# Patient Record
Sex: FEMALE | Race: BLACK OR AFRICAN AMERICAN | URBAN - METROPOLITAN AREA
[De-identification: names, ages, dates, MRNs, and addresses within clinical notes are randomized per-mention and may not be internally consistent; named-entity substitution may affect disease eponyms.]

---
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
## 2025-07-12 ENCOUNTER — APPOINTMENT (OUTPATIENT)
Dept: CT IMAGING | Age: 20
End: 2025-07-12
Attending: STUDENT IN AN ORGANIZED HEALTH CARE EDUCATION/TRAINING PROGRAM

## 2025-07-12 ENCOUNTER — HOSPITAL ENCOUNTER (EMERGENCY)
Age: 20
Discharge: HOME | End: 2025-07-12
Attending: STUDENT IN AN ORGANIZED HEALTH CARE EDUCATION/TRAINING PROGRAM

## 2025-07-12 VITALS
RESPIRATION RATE: 18 BRPM | HEART RATE: 64 BPM | DIASTOLIC BLOOD PRESSURE: 53 MMHG | SYSTOLIC BLOOD PRESSURE: 114 MMHG | WEIGHT: 237 LBS | OXYGEN SATURATION: 100 % | BODY MASS INDEX: 39 KG/M2 | TEMPERATURE: 98 F

## 2025-07-12 DIAGNOSIS — K52.9 GASTROENTERITIS: Primary | ICD-10-CM

## 2025-07-12 LAB
ALBUMIN SERPL-MCNC: 4.3 G/DL (ref 3.2–4.8)
ALBUMIN/GLOB SERPL: 1.3 {RATIO} (ref 1–2)
ALP LIVER SERPL-CCNC: 99 U/L (ref 52–144)
ALT SERPL-CCNC: 9 U/L (ref 10–49)
ANION GAP SERPL CALC-SCNC: 7 MMOL/L (ref 0–18)
AST SERPL-CCNC: 16 U/L (ref ?–34)
B-HCG UR QL: NEGATIVE
BASOPHILS # BLD AUTO: 0.04 X10(3) UL (ref 0–0.2)
BASOPHILS NFR BLD AUTO: 0.3 %
BILIRUB SERPL-MCNC: 0.4 MG/DL (ref 0.3–1.2)
BUN BLD-MCNC: 9 MG/DL (ref 9–23)
CALCIUM BLD-MCNC: 9 MG/DL (ref 8.7–10.6)
CHLORIDE SERPL-SCNC: 104 MMOL/L (ref 98–112)
CO2 SERPL-SCNC: 24 MMOL/L (ref 21–32)
CREAT BLD-MCNC: 0.71 MG/DL (ref 0.55–1.02)
EGFRCR SERPLBLD CKD-EPI 2021: 125 ML/MIN/1.73M2 (ref 60–?)
EOSINOPHIL # BLD AUTO: 0.06 X10(3) UL (ref 0–0.7)
EOSINOPHIL NFR BLD AUTO: 0.5 %
ERYTHROCYTE [DISTWIDTH] IN BLOOD BY AUTOMATED COUNT: 19 %
GLOBULIN PLAS-MCNC: 3.4 G/DL (ref 2–3.5)
GLUCOSE BLD-MCNC: 90 MG/DL (ref 70–99)
HCT VFR BLD AUTO: 28.7 % (ref 35–48)
HGB BLD-MCNC: 9.4 G/DL (ref 12–16)
IMM GRANULOCYTES # BLD AUTO: 0.05 X10(3) UL (ref 0–1)
IMM GRANULOCYTES NFR BLD: 0.4 %
LIPASE SERPL-CCNC: 24 U/L (ref 12–53)
LYMPHOCYTES # BLD AUTO: 1.54 X10(3) UL (ref 1–4)
LYMPHOCYTES NFR BLD AUTO: 11.6 %
MCH RBC QN AUTO: 19.6 PG (ref 26–34)
MCHC RBC AUTO-ENTMCNC: 32.8 G/DL (ref 31–37)
MCV RBC AUTO: 59.9 FL (ref 80–100)
MONOCYTES # BLD AUTO: 0.57 X10(3) UL (ref 0.1–1)
MONOCYTES NFR BLD AUTO: 4.3 %
NEUTROPHILS # BLD AUTO: 11 X10 (3) UL (ref 1.5–7.7)
NEUTROPHILS # BLD AUTO: 11 X10(3) UL (ref 1.5–7.7)
NEUTROPHILS NFR BLD AUTO: 82.9 %
OSMOLALITY SERPL CALC.SUM OF ELEC: 278 MOSM/KG (ref 275–295)
PLATELET # BLD AUTO: 504 10(3)UL (ref 150–450)
POTASSIUM SERPL-SCNC: 4 MMOL/L (ref 3.5–5.1)
PROT SERPL-MCNC: 7.7 G/DL (ref 5.7–8.2)
RBC # BLD AUTO: 4.79 X10(6)UL (ref 3.8–5.3)
SODIUM SERPL-SCNC: 135 MMOL/L (ref 136–145)
WBC # BLD AUTO: 13.3 X10(3) UL (ref 4–11)

## 2025-07-12 PROCEDURE — 80053 COMPREHEN METABOLIC PANEL: CPT | Performed by: STUDENT IN AN ORGANIZED HEALTH CARE EDUCATION/TRAINING PROGRAM

## 2025-07-12 PROCEDURE — 96361 HYDRATE IV INFUSION ADD-ON: CPT

## 2025-07-12 PROCEDURE — 85025 COMPLETE CBC W/AUTO DIFF WBC: CPT | Performed by: STUDENT IN AN ORGANIZED HEALTH CARE EDUCATION/TRAINING PROGRAM

## 2025-07-12 PROCEDURE — 99284 EMERGENCY DEPT VISIT MOD MDM: CPT

## 2025-07-12 PROCEDURE — 96374 THER/PROPH/DIAG INJ IV PUSH: CPT

## 2025-07-12 PROCEDURE — 83690 ASSAY OF LIPASE: CPT | Performed by: STUDENT IN AN ORGANIZED HEALTH CARE EDUCATION/TRAINING PROGRAM

## 2025-07-12 PROCEDURE — 81025 URINE PREGNANCY TEST: CPT

## 2025-07-12 PROCEDURE — 74177 CT ABD & PELVIS W/CONTRAST: CPT | Performed by: STUDENT IN AN ORGANIZED HEALTH CARE EDUCATION/TRAINING PROGRAM

## 2025-07-12 PROCEDURE — 99285 EMERGENCY DEPT VISIT HI MDM: CPT

## 2025-07-12 PROCEDURE — 96375 TX/PRO/DX INJ NEW DRUG ADDON: CPT

## 2025-07-12 RX ORDER — ONDANSETRON 2 MG/ML
4 INJECTION INTRAMUSCULAR; INTRAVENOUS ONCE
Status: COMPLETED | OUTPATIENT
Start: 2025-07-12 | End: 2025-07-12

## 2025-07-12 RX ORDER — ONDANSETRON 4 MG/1
4 TABLET, ORALLY DISINTEGRATING ORAL EVERY 4 HOURS PRN
Qty: 10 TABLET | Refills: 0 | Status: SHIPPED | OUTPATIENT
Start: 2025-07-12 | End: 2025-07-19

## 2025-07-12 RX ORDER — MAGNESIUM HYDROXIDE/ALUMINUM HYDROXICE/SIMETHICONE 120; 1200; 1200 MG/30ML; MG/30ML; MG/30ML
10 SUSPENSION ORAL 4 TIMES DAILY PRN
Qty: 155 ML | Refills: 0 | Status: SHIPPED | OUTPATIENT
Start: 2025-07-12

## 2025-07-12 RX ORDER — KETOROLAC TROMETHAMINE 15 MG/ML
15 INJECTION, SOLUTION INTRAMUSCULAR; INTRAVENOUS ONCE
Status: COMPLETED | OUTPATIENT
Start: 2025-07-12 | End: 2025-07-12

## 2025-07-12 NOTE — ED PROVIDER NOTES
History     Chief Complaint   Patient presents with    Difficulty Breathing       HPI    20 year old female previously healthy presents with abdominal pain.  Since late last night having diffuse abdominal cramps associated with nausea and emesis.  No diarrhea or fevers.  No urinary symptoms.  She just finished her menstrual cycle.  No prior abdominal surgeries.  She does note that she smokes marijuana, no recent alcohol use.          Past Medical History[1]    Past Surgical History[2]    Social History     Socioeconomic History    Marital status: Single   Tobacco Use    Smoking status: Never    Smokeless tobacco: Never   Vaping Use    Vaping status: Never Used   Substance and Sexual Activity    Alcohol use: Yes     Comment: rare    Drug use: Yes     Types: Cannabis                   Physical Exam     ED Triage Vitals   BP 07/12/25 1144 111/63   Pulse 07/12/25 1141 89   Resp 07/12/25 1141 16   Temp 07/12/25 1141 98.2 °F (36.8 °C)   Temp src 07/12/25 1141 Oral   SpO2 07/12/25 1141 98 %   O2 Device 07/12/25 1141 None (Room air)       Physical Exam  Constitutional:       General: She is in acute distress.   Abdominal:      General: Abdomen is flat. There is no distension.      Tenderness: There is abdominal tenderness (Diffuse).   Neurological:      Mental Status: She is alert.              ED Course     Labs Reviewed   CBC WITH DIFFERENTIAL WITH PLATELET - Abnormal; Notable for the following components:       Result Value    WBC 13.3 (*)     HGB 9.4 (*)     HCT 28.7 (*)     .0 (*)     MCV 59.9 (*)     MCH 19.6 (*)     Neutrophil Absolute Prelim 11.00 (*)     Neutrophil Absolute 11.00 (*)     All other components within normal limits   COMP METABOLIC PANEL (14) - Abnormal; Notable for the following components:    Sodium 135 (*)     ALT 9 (*)     All other components within normal limits   LIPASE - Normal   POCT PREGNANCY URINE - Normal   PATH COMMENT CBC     CT ABDOMEN+PELVIS(CONTRAST ONLY)(CPT=74177)  Result  Date: 7/12/2025  CONCLUSION: No acute imaging findings in the abdomen or pelvis. Electronically Verified and Signed by Attending Radiologist: Rhoda Curtis MD 7/12/2025 2:07 PM Workstation: [id number]          [facility]     Vitals:    07/12/25 1141 07/12/25 1144 07/12/25 1406   BP:  111/63 114/53   Pulse: 89  64   Resp: 16  18   Temp: 98.2 °F (36.8 °C)     TempSrc: Oral     SpO2: 98%  100%   Weight: 107.5 kg         Gastroenteritis, pancreatitis, pericolic, gastritis, appendicitis, hyperemesis cannabis on differential  Abd without peritonitis    ED Course as of 07/12/25 1432  ------------------------------------------------------------  Time: 07/12 1322  Comment: Labs with leukocytosis, reassuring renal function and LFTs.  Lipase normal  ------------------------------------------------------------  Time: 07/12 1400  Comment: My interpretation of CT without free air or large fluid collections  ------------------------------------------------------------  Time: 07/12 1426  Comment: Ct neg for acute pathology  ------------------------------------------------------------  Time: 07/12 1430  Comment: Reassessment patient is feeling improved, pain-free, tolerating oral intake  Discussed all findings.  Rx zofran, mylanta. Thc cessation  Patient is feeling well to be discharged.  Vitals remain stable.  Ambulating without difficulty.  Given written and verbal instructions regarding this condition and strict return precautions.   Will follow up in clinic.   Patient verbalizes understanding of instructions and follow up plan, as well as indications to return to the emergency department.           Disposition and Plan     Clinical Impression:  1. Gastroenteritis        Disposition:  Discharge    Follow-up:  pcp    Follow up        Medications Prescribed:  Current Discharge Medication List        START taking these medications    Details   ondansetron 4 MG Oral Tablet Dispersible Take 1 tablet (4 mg total) by mouth every 4 (four) hours  as needed for Nausea.  Qty: 10 tablet, Refills: 0      alum-mag hydroxide-simethicone 200-200-20 MG/5ML Oral Suspension Take 10 mL by mouth 4 (four) times daily as needed for Indigestion.  Qty: 155 mL, Refills: 0                      [1] History reviewed. No pertinent past medical history.  [2] History reviewed. No pertinent surgical history.